# Patient Record
(demographics unavailable — no encounter records)

---

## 2025-02-05 NOTE — SOCIAL HISTORY
[Mother] : mother [Sister] : sister [Grade:  _____] : Grade: [unfilled] [FreeTextEntry1] : came from [place] 1.5 years ago,

## 2025-02-05 NOTE — PHYSICAL EXAM
[Well Developed] : well developed [NAD] : in no acute distress [PERRL] : pupils were equal, round, reactive to light  [Moist & Pink Mucous Membranes] : moist and pink mucous membranes [CTAB] : lungs clear to auscultation bilaterally [Regular Rate and Rhythm] : regular rate and rhythm [Normal S1, S2] : normal S1 and S2 [Soft] : soft  [Tender] : tender  [RLQ] : in the right lower quadrant [LMQ] : in the left middle quadrant [LLQ] : in the left lower quadrant [Normal Bowel Sounds] : normal bowel sounds [No HSM] : no hepatosplenomegaly appreciated [Normal Tone] : normal tone [Well-Perfused] : well-perfused [Interactive] : interactive [icteric] : anicteric [Respiratory Distress] : no respiratory distress  [Distended] : non distended [Tags] : no skin tags  [Fissure] : no anal fissures  [Cyanosis] : no cyanosis [Edema] : no edema [Rash] : no rash [Jaundice] : no jaundice

## 2025-02-05 NOTE — HISTORY OF PRESENT ILLNESS
[de-identified] : referred by  NATACHA ROBLES  JOVANI LOZANO , is  a 15 year old male here for initial   consultation of left-sided abdominal pain that occurs between the upper and lower quadrants for the past 8 months  previously less often and have has been intensifying.  Now is having pain 2-3 times a week he endorses the pain is sometimes gas.  It is relieved with bowel movements but not all the time. occasion nausea.   no vomiting. no heartburn or chest pain. has pain on weekends.   stool are type IV.  daily.   no blood or mucus noted. sometimes has diarrhea with pain.   He has lost about 14 3 months.  He was 120 pounds 3 months ago.  He is not eating well as he feels early satiety.  When he overeats he feels bloated  Denies easy bleeding or bruising, jaundice, hematochezia, melena, recurrent fevers or infection, mouth sores, joint swelling, vision changes, unintentional weight loss.  Denies choking, dysphagia - Past Medical History : No significant past medical history reported - Past Surgical History : No past surgical history - Family History : No reported family history of gastrointestinal or autoimmune disorders - Social History : Patient immigrated from [place] one year ago. Currently in 10th grade. Lives with mother and two sisters. - Review of Systems : Positive for abdominal pain, nausea, bloating, early satiety, and weight loss. Negative for diarrhea, allergies, and other systemic symptoms. - Medications : Ibuprofen as needed for pain - Allergies : No known allergies   Per family he had a normal abdominal ultrasound

## 2025-02-05 NOTE — ASSESSMENT
[FreeTextEntry1] :    - Summary: 15-year-old male with chronic left middle quadrant abdominal pain with some gas, early satiety intermittent diarrhea and some weight loss.     Exam noted tenderness in the right and left lower quadrants   - Differential Diagnosis: Include Irritable Bowel Syndrome, Inflammatory Bowel Disease, Functional Dyspepsia, Gastritis   - Plan:     - Prescribe Iberogast for symptom relief     - Recommend over-the-counter simethicone for gas relief     - Prescribe hyoscyamine for severe pain, to be used 2-3 times daily as needed     - Order blood work, stool study, and urine test     - Recommend high-calorie, nutrient-dense foods and protein shakes to address weight loss     - Advise on diaphragmatic breathing exercises      - Follow up in 4-6 weeks     - Consider endoscopy and colonoscopy if blood work shows concerns     - Obtain and review previous ultrasound report

## 2025-02-05 NOTE — CONSULT LETTER
[Dear  ___] : Dear  [unfilled], [Consult Letter:] : I had the pleasure of evaluating your patient, [unfilled]. [Please see my note below.] : Please see my note below. [Consult Closing:] : Thank you very much for allowing me to participate in the care of this patient.  If you have any questions, please do not hesitate to contact me. [Sincerely,] : Sincerely, [FreeTextEntry3] : Agata Finney MD [facility] physician partners Pediatric gastroenterologist , [facility] of medicine at [facility] [phone] [email or web address]

## 2025-02-05 NOTE — REASON FOR VISIT
[Consultation] : a consultation visit [Mother] : mother [Patient Declined  Services] : - None: Patient declined  services [Interpreters_Relationshiptopatient] : family member

## 2025-02-05 NOTE — FAMILY HISTORY
[Noncontributory] : The patient's family history was noncontributory to the condition being treated. [Celiac Disease] : no celiac disease [Inflammatory Bowel Disease] : no inflammatory bowel disease [Constipation] : no constipation [Irritable Bowel Syndrome] : no irritable bowel syndrome [Liver disease] : no liver disease [Reflux] : no reflux

## 2025-04-02 NOTE — PHYSICAL EXAM
[Well Developed] : well developed [NAD] : in no acute distress [PERRL] : pupils were equal, round, reactive to light  [icteric] : anicteric [Moist & Pink Mucous Membranes] : moist and pink mucous membranes [CTAB] : lungs clear to auscultation bilaterally [Respiratory Distress] : no respiratory distress  [Regular Rate and Rhythm] : regular rate and rhythm [Normal S1, S2] : normal S1 and S2 [Soft] : soft  [Distended] : non distended [Tender] : tender  [LMQ] : in the left middle quadrant [Normal Bowel Sounds] : normal bowel sounds [No HSM] : no hepatosplenomegaly appreciated [Normal Tone] : normal tone [Well-Perfused] : well-perfused [Edema] : no edema [Cyanosis] : no cyanosis [Rash] : no rash [Jaundice] : no jaundice [Interactive] : interactive [de-identified] : Some discomfort in the left middle quadrant

## 2025-04-02 NOTE — HISTORY OF PRESENT ILLNESS
[de-identified] : referred by  NATACHA ROBLES history is confusing based on sister and Jovani's history.   JOVANI LOZANO , is  a 15 year old male here for  FU  consultation of left-sided abdominal pain that occurs between the upper and lower quadrants  and early satiety mom was concerned that was not eating well at the initial visit  H pyrloi Breath test  was positive in Oct 2024, Dec 2025 and then Feb 2025 Unclear to me whether he was treated for H. pylori months. does have pain in left middle quadrant.  last week occurs 2 times/week.  occurs prior to using the bathroom.   sometimes pain is  worsened after having a bowel movement.  I believe he used hyoscyamine once a day for several weeks and he stops.  I also believe he used IBgard In the past which helped.  History is a bit unclear. Gained 6 lbs now.    Overall he is eating better now and less early satiety. no heartburn or chest pain.  stool are type IV.  daily.    Denies easy bleeding or bruising, jaundice, hematochezia, melena, recurrent fevers or infection, mouth sores, joint swelling, vision changes, unintentional weight loss.  [de-identified] : Labs celiac labs normal CBC, CMP normal calprotectin normal H pylori BT positive  in October, dec 2024 and 2/2025. unclear if he was treated for that or not. dont have PCP records

## 2025-04-02 NOTE — CONSULT LETTER
[Dear  ___] : Dear  [unfilled], [Consult Letter:] : I had the pleasure of evaluating your patient, [unfilled]. [Please see my note below.] : Please see my note below. [Consult Closing:] : Thank you very much for allowing me to participate in the care of this patient.  If you have any questions, please do not hesitate to contact me. [Sincerely,] : Sincerely, [FreeTextEntry1] : Please send any progress notes or growth charts from the last year. [FreeTextEntry3] : Agata Finney MD [facility] physician partners Pediatric gastroenterologist , [facility] of medicine at [facility] [phone] [email or web address]

## 2025-04-02 NOTE — PHYSICAL EXAM
[Well Developed] : well developed [NAD] : in no acute distress [PERRL] : pupils were equal, round, reactive to light  [icteric] : anicteric [Moist & Pink Mucous Membranes] : moist and pink mucous membranes [CTAB] : lungs clear to auscultation bilaterally [Respiratory Distress] : no respiratory distress  [Regular Rate and Rhythm] : regular rate and rhythm [Normal S1, S2] : normal S1 and S2 [Soft] : soft  [Distended] : non distended [Tender] : tender  [LMQ] : in the left middle quadrant [Normal Bowel Sounds] : normal bowel sounds [No HSM] : no hepatosplenomegaly appreciated [Normal Tone] : normal tone [Well-Perfused] : well-perfused [Edema] : no edema [Cyanosis] : no cyanosis [Rash] : no rash [Jaundice] : no jaundice [Interactive] : interactive [de-identified] : Some discomfort in the left middle quadrant

## 2025-04-02 NOTE — HISTORY OF PRESENT ILLNESS
[de-identified] : referred by  NATACHA ROBLES history is confusing based on sister and Jovani's history.   JOVANI LOZANO , is  a 15 year old male here for  FU  consultation of left-sided abdominal pain that occurs between the upper and lower quadrants  and early satiety mom was concerned that was not eating well at the initial visit  H pyrloi Breath test  was positive in Oct 2024, Dec 2025 and then Feb 2025 Unclear to me whether he was treated for H. pylori months. does have pain in left middle quadrant.  last week occurs 2 times/week.  occurs prior to using the bathroom.   sometimes pain is  worsened after having a bowel movement.  I believe he used hyoscyamine once a day for several weeks and he stops.  I also believe he used IBgard In the past which helped.  History is a bit unclear. Gained 6 lbs now.    Overall he is eating better now and less early satiety. no heartburn or chest pain.  stool are type IV.  daily.    Denies easy bleeding or bruising, jaundice, hematochezia, melena, recurrent fevers or infection, mouth sores, joint swelling, vision changes, unintentional weight loss.  [de-identified] : Labs celiac labs normal CBC, CMP normal calprotectin normal H pylori BT positive  in October, dec 2024 and 2/2025. unclear if he was treated for that or not. dont have PCP records

## 2025-04-02 NOTE — CONSULT LETTER
[Dear  ___] : Dear  [unfilled], [Consult Letter:] : I had the pleasure of evaluating your patient, [unfilled]. [Please see my note below.] : Please see my note below. [Consult Closing:] : Thank you very much for allowing me to participate in the care of this patient.  If you have any questions, please do not hesitate to contact me. [Sincerely,] : Sincerely, [FreeTextEntry1] : Please send any progress notes or growth charts from the last year. [FreeTextEntry3] : Agata Finney MD [facility] physician partners Pediatric gastroenterologist , [facility] of medicine at [facility] [phone] [email or web address]

## 2025-04-02 NOTE — ASSESSMENT
[Educated Patient & Family about Diagnosis] : educated the patient and family about the diagnosis [FreeTextEntry1] : 15-year-old male with chronic left middle quadrant abdominal pain, early satiety and intermittent diarrhea.  On review of labs with H. pylori testing from October December and that were both positive for H. pylori.  I am not clear if he was treated more than once.  Overall his pain is improved and his weight has improved as well.  He is eating better.  I believe he used IBgard and hyoscyamine but also not clear to me. On exam there is some tenderness in the left middle quadrant. Differential diagnosis includes functional abdominal pain versus H. pylori versus gas    I would like to obtain records to see if he was treated for H. pylori.  Continue hyoscyamine every 4-6 hours as needed for pain  Continue IBgard up to 3 times a day as needed for pain  Follow-up in 2 months.  If continues to have symptoms suggestive of active H. pylori infection would consider retreatment versus endoscopy   This note was done with a voice recognition transcription software and any typos are related to this rather than medical error.